# Patient Record
Sex: FEMALE | URBAN - METROPOLITAN AREA
[De-identification: names, ages, dates, MRNs, and addresses within clinical notes are randomized per-mention and may not be internally consistent; named-entity substitution may affect disease eponyms.]

---

## 2017-06-29 NOTE — PATIENT DISCUSSION
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 Discussed benefits, alternatives, and risks of surgery including (but not limited to) cataract (if natural lens is still present), infection, bleeding, retinal detachment, optic neuropathy, loss of vision, blindness, and loss of eye. Patient was told the vision may not return to the same level as prior to development of the [place] but should improve as the anatomy returns to a more normal contour. Patient understands most patients end up with some distortion even after successful macular hole surgery. No prediction of the level of visual improvement and/or the degree of distortion reduction can be given.

## 2019-10-29 ENCOUNTER — IMPORTED ENCOUNTER (OUTPATIENT)
Dept: URBAN - METROPOLITAN AREA CLINIC 9 | Facility: CLINIC | Age: 84
End: 2019-10-29

## 2021-10-16 ASSESSMENT — KERATOMETRY
OS_K2POWER_DIOPTERS: 46.75
OS_AXISANGLE_DEGREES: 39
OD_AXISANGLE2_DEGREES: 80
OS_AXISANGLE2_DEGREES: 129
OD_K1POWER_DIOPTERS: 46.25
OS_K1POWER_DIOPTERS: 46.5
OD_K2POWER_DIOPTERS: 46.5
OD_AXISANGLE_DEGREES: 170

## 2021-10-16 ASSESSMENT — VISUAL ACUITY
OD_CC: 20/40 SN
OS_CC: 20/30 SN
OS_SC: 20/60 - SN
OS_CC: 20/40 SN
OD_SC: 20/60 - SN

## 2021-10-16 ASSESSMENT — TONOMETRY
OD_IOP_MMHG: 20
OS_IOP_MMHG: 20

## 2022-07-04 RX ORDER — AMLODIPINE BESYLATE 5 MG/1
TABLET ORAL
COMMUNITY